# Patient Record
Sex: FEMALE | Race: WHITE | Employment: UNEMPLOYED | URBAN - METROPOLITAN AREA
[De-identification: names, ages, dates, MRNs, and addresses within clinical notes are randomized per-mention and may not be internally consistent; named-entity substitution may affect disease eponyms.]

---

## 2021-11-22 ENCOUNTER — APPOINTMENT (OUTPATIENT)
Dept: ULTRASOUND IMAGING | Age: 36
End: 2021-11-22
Attending: STUDENT IN AN ORGANIZED HEALTH CARE EDUCATION/TRAINING PROGRAM
Payer: COMMERCIAL

## 2021-11-22 ENCOUNTER — HOSPITAL ENCOUNTER (EMERGENCY)
Age: 36
Discharge: HOME | End: 2021-11-23
Attending: STUDENT IN AN ORGANIZED HEALTH CARE EDUCATION/TRAINING PROGRAM
Payer: COMMERCIAL

## 2021-11-22 DIAGNOSIS — O00.202 ECTOPIC PREGNANCY OF LEFT OVARY: Primary | ICD-10-CM

## 2021-11-22 LAB
ANION GAP SERPL CALC-SCNC: 5 MMOL/L (ref 3–18)
APPEARANCE UR: CLEAR
BACTERIA URNS QL MICRO: ABNORMAL /HPF
BASOPHILS # BLD: 0.1 K/UL (ref 0–0.1)
BASOPHILS NFR BLD: 1 % (ref 0–2)
BILIRUB UR QL: NEGATIVE
BUN SERPL-MCNC: 12 MG/DL (ref 7–18)
BUN/CREAT SERPL: 14 (ref 12–20)
CALCIUM SERPL-MCNC: 9.4 MG/DL (ref 8.5–10.1)
CHLORIDE SERPL-SCNC: 106 MMOL/L (ref 100–111)
CO2 SERPL-SCNC: 27 MMOL/L (ref 21–32)
COLOR UR: YELLOW
CREAT SERPL-MCNC: 0.88 MG/DL (ref 0.6–1.3)
DIFFERENTIAL METHOD BLD: ABNORMAL
EOSINOPHIL # BLD: 0.2 K/UL (ref 0–0.4)
EOSINOPHIL NFR BLD: 3 % (ref 0–5)
EPITH CASTS URNS QL MICRO: ABNORMAL /LPF (ref 0–5)
ERYTHROCYTE [DISTWIDTH] IN BLOOD BY AUTOMATED COUNT: 11.9 % (ref 11.6–14.5)
GLUCOSE SERPL-MCNC: 101 MG/DL (ref 74–99)
GLUCOSE UR STRIP.AUTO-MCNC: NEGATIVE MG/DL
HCG SERPL-ACNC: 681 MIU/ML (ref 0–10)
HCT VFR BLD AUTO: 36.5 % (ref 35–45)
HGB BLD-MCNC: 12.5 G/DL (ref 12–16)
HGB UR QL STRIP: ABNORMAL
IMM GRANULOCYTES # BLD AUTO: 0 K/UL (ref 0–0.04)
IMM GRANULOCYTES NFR BLD AUTO: 0 % (ref 0–0.5)
KETONES UR QL STRIP.AUTO: ABNORMAL MG/DL
LEUKOCYTE ESTERASE UR QL STRIP.AUTO: NEGATIVE
LYMPHOCYTES # BLD: 2.4 K/UL (ref 0.9–3.6)
LYMPHOCYTES NFR BLD: 29 % (ref 21–52)
MCH RBC QN AUTO: 30.7 PG (ref 24–34)
MCHC RBC AUTO-ENTMCNC: 34.2 G/DL (ref 31–37)
MCV RBC AUTO: 89.7 FL (ref 78–100)
MONOCYTES # BLD: 0.4 K/UL (ref 0.05–1.2)
MONOCYTES NFR BLD: 5 % (ref 3–10)
NEUTS SEG # BLD: 5.1 K/UL (ref 1.8–8)
NEUTS SEG NFR BLD: 62 % (ref 40–73)
NITRITE UR QL STRIP.AUTO: NEGATIVE
NRBC # BLD: 0 K/UL (ref 0–0.01)
NRBC BLD-RTO: 0 PER 100 WBC
PH UR STRIP: 6.5 [PH] (ref 5–8)
PLATELET # BLD AUTO: 260 K/UL (ref 135–420)
PMV BLD AUTO: 9.9 FL (ref 9.2–11.8)
POTASSIUM SERPL-SCNC: 3.5 MMOL/L (ref 3.5–5.5)
PROT UR STRIP-MCNC: NEGATIVE MG/DL
RBC # BLD AUTO: 4.07 M/UL (ref 4.2–5.3)
RBC #/AREA URNS HPF: ABNORMAL /HPF (ref 0–5)
SODIUM SERPL-SCNC: 138 MMOL/L (ref 136–145)
SP GR UR REFRACTOMETRY: 1 (ref 1–1.03)
UROBILINOGEN UR QL STRIP.AUTO: 0.2 EU/DL (ref 0.2–1)
WBC # BLD AUTO: 8.2 K/UL (ref 4.6–13.2)
WBC URNS QL MICRO: ABNORMAL /HPF (ref 0–4)

## 2021-11-22 PROCEDURE — 80048 BASIC METABOLIC PNL TOTAL CA: CPT

## 2021-11-22 PROCEDURE — 85025 COMPLETE CBC W/AUTO DIFF WBC: CPT

## 2021-11-22 PROCEDURE — 99284 EMERGENCY DEPT VISIT MOD MDM: CPT

## 2021-11-22 PROCEDURE — 81001 URINALYSIS AUTO W/SCOPE: CPT

## 2021-11-22 PROCEDURE — 86901 BLOOD TYPING SEROLOGIC RH(D): CPT

## 2021-11-22 PROCEDURE — 84702 CHORIONIC GONADOTROPIN TEST: CPT

## 2021-11-22 PROCEDURE — 76817 TRANSVAGINAL US OBSTETRIC: CPT

## 2021-11-23 ENCOUNTER — ANESTHESIA (OUTPATIENT)
Dept: SURGERY | Age: 36
End: 2021-11-23
Payer: COMMERCIAL

## 2021-11-23 ENCOUNTER — HOSPITAL ENCOUNTER (OUTPATIENT)
Age: 36
Setting detail: OUTPATIENT SURGERY
Discharge: HOME | End: 2021-11-23
Attending: STUDENT IN AN ORGANIZED HEALTH CARE EDUCATION/TRAINING PROGRAM | Admitting: STUDENT IN AN ORGANIZED HEALTH CARE EDUCATION/TRAINING PROGRAM
Payer: COMMERCIAL

## 2021-11-23 ENCOUNTER — ANESTHESIA EVENT (OUTPATIENT)
Dept: SURGERY | Age: 36
End: 2021-11-23
Payer: COMMERCIAL

## 2021-11-23 ENCOUNTER — APPOINTMENT (OUTPATIENT)
Dept: ULTRASOUND IMAGING | Age: 36
End: 2021-11-23
Attending: STUDENT IN AN ORGANIZED HEALTH CARE EDUCATION/TRAINING PROGRAM
Payer: COMMERCIAL

## 2021-11-23 VITALS
SYSTOLIC BLOOD PRESSURE: 148 MMHG | WEIGHT: 170.4 LBS | DIASTOLIC BLOOD PRESSURE: 93 MMHG | HEART RATE: 102 BPM | BODY MASS INDEX: 26.74 KG/M2 | HEIGHT: 67 IN | RESPIRATION RATE: 20 BRPM | TEMPERATURE: 99.5 F | OXYGEN SATURATION: 100 %

## 2021-11-23 VITALS
RESPIRATION RATE: 20 BRPM | OXYGEN SATURATION: 100 % | DIASTOLIC BLOOD PRESSURE: 55 MMHG | HEART RATE: 87 BPM | SYSTOLIC BLOOD PRESSURE: 105 MMHG | TEMPERATURE: 98.2 F

## 2021-11-23 DIAGNOSIS — Z98.890 STATUS POST LAPAROSCOPY: Primary | ICD-10-CM

## 2021-11-23 PROBLEM — O36.80X0 PREGNANCY OF UNKNOWN ANATOMIC LOCATION: Status: ACTIVE | Noted: 2021-11-23

## 2021-11-23 LAB
ABO + RH BLD: NORMAL
ABO + RH BLD: NORMAL
ALBUMIN SERPL-MCNC: 4 G/DL (ref 3.4–5)
ALBUMIN/GLOB SERPL: 1.1 {RATIO} (ref 0.8–1.7)
ALP SERPL-CCNC: 51 U/L (ref 45–117)
ALT SERPL-CCNC: 20 U/L (ref 13–56)
ANION GAP SERPL CALC-SCNC: 7 MMOL/L (ref 3–18)
AST SERPL-CCNC: 13 U/L (ref 10–38)
ATRIAL RATE: 91 BPM
BASOPHILS # BLD: 0 K/UL (ref 0–0.1)
BASOPHILS NFR BLD: 1 % (ref 0–2)
BILIRUB DIRECT SERPL-MCNC: 0.2 MG/DL (ref 0–0.2)
BILIRUB SERPL-MCNC: 0.6 MG/DL (ref 0.2–1)
BLOOD GROUP ANTIBODIES SERPL: NORMAL
BLOOD GROUP ANTIBODIES SERPL: NORMAL
BUN SERPL-MCNC: 8 MG/DL (ref 7–18)
BUN/CREAT SERPL: 11 (ref 12–20)
C TRACH RRNA SPEC QL NAA+PROBE: NEGATIVE
CALCIUM SERPL-MCNC: 8.9 MG/DL (ref 8.5–10.1)
CALCULATED P AXIS, ECG09: 63 DEGREES
CALCULATED R AXIS, ECG10: 78 DEGREES
CALCULATED T AXIS, ECG11: 75 DEGREES
CHLORIDE SERPL-SCNC: 109 MMOL/L (ref 100–111)
CO2 SERPL-SCNC: 24 MMOL/L (ref 21–32)
COVID-19 RAPID TEST, COVR: NOT DETECTED
CREAT SERPL-MCNC: 0.71 MG/DL (ref 0.6–1.3)
DIAGNOSIS, 93000: NORMAL
DIFFERENTIAL METHOD BLD: ABNORMAL
EOSINOPHIL # BLD: 0.1 K/UL (ref 0–0.4)
EOSINOPHIL NFR BLD: 1 % (ref 0–5)
ERYTHROCYTE [DISTWIDTH] IN BLOOD BY AUTOMATED COUNT: 11.9 % (ref 11.6–14.5)
ERYTHROCYTE [DISTWIDTH] IN BLOOD BY AUTOMATED COUNT: 11.9 % (ref 11.6–14.5)
GLOBULIN SER CALC-MCNC: 3.5 G/DL (ref 2–4)
GLUCOSE SERPL-MCNC: 90 MG/DL (ref 74–99)
HCG SERPL-ACNC: 626 MIU/ML (ref 0–10)
HCG SERPL-ACNC: 879 MIU/ML (ref 0–10)
HCT VFR BLD AUTO: 33.3 % (ref 35–45)
HCT VFR BLD AUTO: 36.7 % (ref 35–45)
HGB BLD-MCNC: 11.1 G/DL (ref 12–16)
HGB BLD-MCNC: 12.2 G/DL (ref 12–16)
IMM GRANULOCYTES # BLD AUTO: 0 K/UL (ref 0–0.04)
IMM GRANULOCYTES NFR BLD AUTO: 0 % (ref 0–0.5)
LIPASE SERPL-CCNC: 65 U/L (ref 73–393)
LYMPHOCYTES # BLD: 1.7 K/UL (ref 0.9–3.6)
LYMPHOCYTES NFR BLD: 23 % (ref 21–52)
MCH RBC QN AUTO: 30 PG (ref 24–34)
MCH RBC QN AUTO: 30.7 PG (ref 24–34)
MCHC RBC AUTO-ENTMCNC: 33.2 G/DL (ref 31–37)
MCHC RBC AUTO-ENTMCNC: 33.3 G/DL (ref 31–37)
MCV RBC AUTO: 90.4 FL (ref 78–100)
MCV RBC AUTO: 92 FL (ref 78–100)
MONOCYTES # BLD: 0.4 K/UL (ref 0.05–1.2)
MONOCYTES NFR BLD: 5 % (ref 3–10)
N GONORRHOEA RRNA SPEC QL NAA+PROBE: NEGATIVE
NEUTS SEG # BLD: 5.5 K/UL (ref 1.8–8)
NEUTS SEG NFR BLD: 71 % (ref 40–73)
NRBC # BLD: 0 K/UL (ref 0–0.01)
NRBC # BLD: 0 K/UL (ref 0–0.01)
NRBC BLD-RTO: 0 PER 100 WBC
NRBC BLD-RTO: 0 PER 100 WBC
P-R INTERVAL, ECG05: 156 MS
PLATELET # BLD AUTO: 209 K/UL (ref 135–420)
PLATELET # BLD AUTO: 248 K/UL (ref 135–420)
PMV BLD AUTO: 10.4 FL (ref 9.2–11.8)
PMV BLD AUTO: 9.8 FL (ref 9.2–11.8)
POTASSIUM SERPL-SCNC: 3.6 MMOL/L (ref 3.5–5.5)
PROT SERPL-MCNC: 7.5 G/DL (ref 6.4–8.2)
Q-T INTERVAL, ECG07: 366 MS
QRS DURATION, ECG06: 74 MS
QTC CALCULATION (BEZET), ECG08: 450 MS
RBC # BLD AUTO: 3.62 M/UL (ref 4.2–5.3)
RBC # BLD AUTO: 4.06 M/UL (ref 4.2–5.3)
SODIUM SERPL-SCNC: 140 MMOL/L (ref 136–145)
SOURCE, COVRS: NORMAL
SPECIMEN EXP DATE BLD: NORMAL
SPECIMEN EXP DATE BLD: NORMAL
SPECIMEN SOURCE: NORMAL
VENTRICULAR RATE, ECG03: 91 BPM
WBC # BLD AUTO: 6.2 K/UL (ref 4.6–13.2)
WBC # BLD AUTO: 7.7 K/UL (ref 4.6–13.2)

## 2021-11-23 PROCEDURE — 77030002933 HC SUT MCRYL J&J -A: Performed by: STUDENT IN AN ORGANIZED HEALTH CARE EDUCATION/TRAINING PROGRAM

## 2021-11-23 PROCEDURE — G0378 HOSPITAL OBSERVATION PER HR: HCPCS

## 2021-11-23 PROCEDURE — 77030016151 HC PROTCTR LNS DFOG COVD -B: Performed by: STUDENT IN AN ORGANIZED HEALTH CARE EDUCATION/TRAINING PROGRAM

## 2021-11-23 PROCEDURE — 74011250636 HC RX REV CODE- 250/636: Performed by: STUDENT IN AN ORGANIZED HEALTH CARE EDUCATION/TRAINING PROGRAM

## 2021-11-23 PROCEDURE — 99285 EMERGENCY DEPT VISIT HI MDM: CPT

## 2021-11-23 PROCEDURE — 77030020782 HC GWN BAIR PAWS FLX 3M -B: Performed by: STUDENT IN AN ORGANIZED HEALTH CARE EDUCATION/TRAINING PROGRAM

## 2021-11-23 PROCEDURE — 87086 URINE CULTURE/COLONY COUNT: CPT

## 2021-11-23 PROCEDURE — 77030022703 HC LIGASURE  BLNT LAPSCP SEAL COVD -E: Performed by: STUDENT IN AN ORGANIZED HEALTH CARE EDUCATION/TRAINING PROGRAM

## 2021-11-23 PROCEDURE — 76817 TRANSVAGINAL US OBSTETRIC: CPT

## 2021-11-23 PROCEDURE — 77030040830 HC CATH URETH FOL MDII -A: Performed by: STUDENT IN AN ORGANIZED HEALTH CARE EDUCATION/TRAINING PROGRAM

## 2021-11-23 PROCEDURE — 93005 ELECTROCARDIOGRAM TRACING: CPT

## 2021-11-23 PROCEDURE — 77030013079 HC BLNKT BAIR HGGR 3M -A: Performed by: ANESTHESIOLOGY

## 2021-11-23 PROCEDURE — 74011250637 HC RX REV CODE- 250/637: Performed by: STUDENT IN AN ORGANIZED HEALTH CARE EDUCATION/TRAINING PROGRAM

## 2021-11-23 PROCEDURE — 76210000026 HC REC RM PH II 1 TO 1.5 HR: Performed by: STUDENT IN AN ORGANIZED HEALTH CARE EDUCATION/TRAINING PROGRAM

## 2021-11-23 PROCEDURE — 77030020407 HC IV BLD WRMR ST 3M -A: Performed by: ANESTHESIOLOGY

## 2021-11-23 PROCEDURE — 84702 CHORIONIC GONADOTROPIN TEST: CPT

## 2021-11-23 PROCEDURE — 74011000250 HC RX REV CODE- 250: Performed by: STUDENT IN AN ORGANIZED HEALTH CARE EDUCATION/TRAINING PROGRAM

## 2021-11-23 PROCEDURE — 80076 HEPATIC FUNCTION PANEL: CPT

## 2021-11-23 PROCEDURE — 77030008608 HC TRCR ENDOSC SMTH AMR -B: Performed by: STUDENT IN AN ORGANIZED HEALTH CARE EDUCATION/TRAINING PROGRAM

## 2021-11-23 PROCEDURE — 88305 TISSUE EXAM BY PATHOLOGIST: CPT

## 2021-11-23 PROCEDURE — 74011000250 HC RX REV CODE- 250: Performed by: NURSE ANESTHETIST, CERTIFIED REGISTERED

## 2021-11-23 PROCEDURE — 76010000149 HC OR TIME 1 TO 1.5 HR: Performed by: STUDENT IN AN ORGANIZED HEALTH CARE EDUCATION/TRAINING PROGRAM

## 2021-11-23 PROCEDURE — 83690 ASSAY OF LIPASE: CPT

## 2021-11-23 PROCEDURE — 77030008683 HC TU ET CUF COVD -A: Performed by: ANESTHESIOLOGY

## 2021-11-23 PROCEDURE — 77030008477 HC STYL SATN SLP COVD -A: Performed by: ANESTHESIOLOGY

## 2021-11-23 PROCEDURE — 87635 SARS-COV-2 COVID-19 AMP PRB: CPT

## 2021-11-23 PROCEDURE — 77030006643: Performed by: ANESTHESIOLOGY

## 2021-11-23 PROCEDURE — 76210000006 HC OR PH I REC 0.5 TO 1 HR: Performed by: STUDENT IN AN ORGANIZED HEALTH CARE EDUCATION/TRAINING PROGRAM

## 2021-11-23 PROCEDURE — 77030008574 HC TBNG SUC IRR STRY -B: Performed by: STUDENT IN AN ORGANIZED HEALTH CARE EDUCATION/TRAINING PROGRAM

## 2021-11-23 PROCEDURE — 85025 COMPLETE CBC W/AUTO DIFF WBC: CPT

## 2021-11-23 PROCEDURE — 86901 BLOOD TYPING SEROLOGIC RH(D): CPT

## 2021-11-23 PROCEDURE — 74011250637 HC RX REV CODE- 250/637: Performed by: ANESTHESIOLOGY

## 2021-11-23 PROCEDURE — 87491 CHLMYD TRACH DNA AMP PROBE: CPT

## 2021-11-23 PROCEDURE — 77030009851 HC PCH RTVR ENDOSC AMR -B: Performed by: STUDENT IN AN ORGANIZED HEALTH CARE EDUCATION/TRAINING PROGRAM

## 2021-11-23 PROCEDURE — 77030010507 HC ADH SKN DERMBND J&J -B: Performed by: STUDENT IN AN ORGANIZED HEALTH CARE EDUCATION/TRAINING PROGRAM

## 2021-11-23 PROCEDURE — 85027 COMPLETE CBC AUTOMATED: CPT

## 2021-11-23 PROCEDURE — 77030040361 HC SLV COMPR DVT MDII -B: Performed by: STUDENT IN AN ORGANIZED HEALTH CARE EDUCATION/TRAINING PROGRAM

## 2021-11-23 PROCEDURE — 77030014090 HC TRCR OPT AMR -B: Performed by: STUDENT IN AN ORGANIZED HEALTH CARE EDUCATION/TRAINING PROGRAM

## 2021-11-23 PROCEDURE — 77030018684: Performed by: STUDENT IN AN ORGANIZED HEALTH CARE EDUCATION/TRAINING PROGRAM

## 2021-11-23 PROCEDURE — 2709999900 HC NON-CHARGEABLE SUPPLY: Performed by: STUDENT IN AN ORGANIZED HEALTH CARE EDUCATION/TRAINING PROGRAM

## 2021-11-23 PROCEDURE — 77030031139 HC SUT VCRL2 J&J -A: Performed by: STUDENT IN AN ORGANIZED HEALTH CARE EDUCATION/TRAINING PROGRAM

## 2021-11-23 PROCEDURE — 77030012799 HC TRCR GELPRT BLN AMR -B: Performed by: STUDENT IN AN ORGANIZED HEALTH CARE EDUCATION/TRAINING PROGRAM

## 2021-11-23 PROCEDURE — 76060000033 HC ANESTHESIA 1 TO 1.5 HR: Performed by: STUDENT IN AN ORGANIZED HEALTH CARE EDUCATION/TRAINING PROGRAM

## 2021-11-23 PROCEDURE — 74011250636 HC RX REV CODE- 250/636: Performed by: NURSE ANESTHETIST, CERTIFIED REGISTERED

## 2021-11-23 PROCEDURE — 96374 THER/PROPH/DIAG INJ IV PUSH: CPT

## 2021-11-23 PROCEDURE — 77030020829: Performed by: STUDENT IN AN ORGANIZED HEALTH CARE EDUCATION/TRAINING PROGRAM

## 2021-11-23 PROCEDURE — 80048 BASIC METABOLIC PNL TOTAL CA: CPT

## 2021-11-23 RX ORDER — SODIUM CHLORIDE, SODIUM LACTATE, POTASSIUM CHLORIDE, CALCIUM CHLORIDE 600; 310; 30; 20 MG/100ML; MG/100ML; MG/100ML; MG/100ML
100 INJECTION, SOLUTION INTRAVENOUS CONTINUOUS
Status: DISCONTINUED | OUTPATIENT
Start: 2021-11-23 | End: 2021-11-24 | Stop reason: HOSPADM

## 2021-11-23 RX ORDER — PHENYLEPHRINE HCL IN 0.9% NACL 1 MG/10 ML
SYRINGE (ML) INTRAVENOUS AS NEEDED
Status: DISCONTINUED | OUTPATIENT
Start: 2021-11-23 | End: 2021-11-23 | Stop reason: HOSPADM

## 2021-11-23 RX ORDER — FENTANYL CITRATE 50 UG/ML
INJECTION, SOLUTION INTRAMUSCULAR; INTRAVENOUS AS NEEDED
Status: DISCONTINUED | OUTPATIENT
Start: 2021-11-23 | End: 2021-11-23 | Stop reason: HOSPADM

## 2021-11-23 RX ORDER — CEFAZOLIN SODIUM/WATER 2 G/20 ML
2 SYRINGE (ML) INTRAVENOUS ONCE
Status: COMPLETED | OUTPATIENT
Start: 2021-11-23 | End: 2021-11-23

## 2021-11-23 RX ORDER — SCOLOPAMINE TRANSDERMAL SYSTEM 1 MG/1
1 PATCH, EXTENDED RELEASE TRANSDERMAL
Status: DISCONTINUED | OUTPATIENT
Start: 2021-11-23 | End: 2021-11-24 | Stop reason: HOSPADM

## 2021-11-23 RX ORDER — KETOROLAC TROMETHAMINE 15 MG/ML
INJECTION, SOLUTION INTRAMUSCULAR; INTRAVENOUS AS NEEDED
Status: DISCONTINUED | OUTPATIENT
Start: 2021-11-23 | End: 2021-11-23 | Stop reason: HOSPADM

## 2021-11-23 RX ORDER — DIPHENHYDRAMINE HYDROCHLORIDE 50 MG/ML
12.5 INJECTION, SOLUTION INTRAMUSCULAR; INTRAVENOUS
Status: DISCONTINUED | OUTPATIENT
Start: 2021-11-23 | End: 2021-11-24 | Stop reason: HOSPADM

## 2021-11-23 RX ORDER — DEXAMETHASONE SODIUM PHOSPHATE 4 MG/ML
INJECTION, SOLUTION INTRA-ARTICULAR; INTRALESIONAL; INTRAMUSCULAR; INTRAVENOUS; SOFT TISSUE AS NEEDED
Status: DISCONTINUED | OUTPATIENT
Start: 2021-11-23 | End: 2021-11-23 | Stop reason: HOSPADM

## 2021-11-23 RX ORDER — ROCURONIUM BROMIDE 10 MG/ML
INJECTION, SOLUTION INTRAVENOUS AS NEEDED
Status: DISCONTINUED | OUTPATIENT
Start: 2021-11-23 | End: 2021-11-23 | Stop reason: HOSPADM

## 2021-11-23 RX ORDER — GLYCOPYRROLATE 0.2 MG/ML
INJECTION INTRAMUSCULAR; INTRAVENOUS AS NEEDED
Status: DISCONTINUED | OUTPATIENT
Start: 2021-11-23 | End: 2021-11-23 | Stop reason: HOSPADM

## 2021-11-23 RX ORDER — ONDANSETRON 4 MG/1
4 TABLET, ORALLY DISINTEGRATING ORAL
Qty: 30 TABLET | Refills: 0 | Status: SHIPPED | OUTPATIENT
Start: 2021-11-23

## 2021-11-23 RX ORDER — OXYCODONE HYDROCHLORIDE 5 MG/1
5 TABLET ORAL
Qty: 6 TABLET | Refills: 0 | Status: SHIPPED | OUTPATIENT
Start: 2021-11-23 | End: 2021-11-26

## 2021-11-23 RX ORDER — PROPOFOL 10 MG/ML
INJECTION, EMULSION INTRAVENOUS AS NEEDED
Status: DISCONTINUED | OUTPATIENT
Start: 2021-11-23 | End: 2021-11-23 | Stop reason: HOSPADM

## 2021-11-23 RX ORDER — NEOSTIGMINE METHYLSULFATE 1 MG/ML
INJECTION, SOLUTION INTRAVENOUS AS NEEDED
Status: DISCONTINUED | OUTPATIENT
Start: 2021-11-23 | End: 2021-11-23 | Stop reason: HOSPADM

## 2021-11-23 RX ORDER — MIDAZOLAM HYDROCHLORIDE 1 MG/ML
INJECTION, SOLUTION INTRAMUSCULAR; INTRAVENOUS AS NEEDED
Status: DISCONTINUED | OUTPATIENT
Start: 2021-11-23 | End: 2021-11-23 | Stop reason: HOSPADM

## 2021-11-23 RX ORDER — KETOROLAC TROMETHAMINE 30 MG/ML
30 INJECTION, SOLUTION INTRAMUSCULAR; INTRAVENOUS
Status: COMPLETED | OUTPATIENT
Start: 2021-11-23 | End: 2021-11-23

## 2021-11-23 RX ORDER — LIDOCAINE HYDROCHLORIDE 20 MG/ML
INJECTION, SOLUTION EPIDURAL; INFILTRATION; INTRACAUDAL; PERINEURAL AS NEEDED
Status: DISCONTINUED | OUTPATIENT
Start: 2021-11-23 | End: 2021-11-23 | Stop reason: HOSPADM

## 2021-11-23 RX ORDER — IBUPROFEN 400 MG/1
800 TABLET ORAL
Status: DISCONTINUED | OUTPATIENT
Start: 2021-11-23 | End: 2021-11-24 | Stop reason: HOSPADM

## 2021-11-23 RX ORDER — ALBUTEROL SULFATE 0.83 MG/ML
2.5 SOLUTION RESPIRATORY (INHALATION)
Status: DISCONTINUED | OUTPATIENT
Start: 2021-11-23 | End: 2021-11-24 | Stop reason: HOSPADM

## 2021-11-23 RX ORDER — METOCLOPRAMIDE HYDROCHLORIDE 5 MG/ML
INJECTION INTRAMUSCULAR; INTRAVENOUS AS NEEDED
Status: DISCONTINUED | OUTPATIENT
Start: 2021-11-23 | End: 2021-11-23 | Stop reason: HOSPADM

## 2021-11-23 RX ORDER — ONDANSETRON 2 MG/ML
INJECTION INTRAMUSCULAR; INTRAVENOUS AS NEEDED
Status: DISCONTINUED | OUTPATIENT
Start: 2021-11-23 | End: 2021-11-23 | Stop reason: HOSPADM

## 2021-11-23 RX ORDER — NALOXONE HYDROCHLORIDE 0.4 MG/ML
0.1 INJECTION, SOLUTION INTRAMUSCULAR; INTRAVENOUS; SUBCUTANEOUS AS NEEDED
Status: DISCONTINUED | OUTPATIENT
Start: 2021-11-23 | End: 2021-11-24 | Stop reason: HOSPADM

## 2021-11-23 RX ORDER — KETAMINE HYDROCHLORIDE 10 MG/ML
INJECTION, SOLUTION INTRAMUSCULAR; INTRAVENOUS AS NEEDED
Status: DISCONTINUED | OUTPATIENT
Start: 2021-11-23 | End: 2021-11-23 | Stop reason: HOSPADM

## 2021-11-23 RX ORDER — HYDROMORPHONE HYDROCHLORIDE 2 MG/ML
0.5 INJECTION, SOLUTION INTRAMUSCULAR; INTRAVENOUS; SUBCUTANEOUS
Status: DISCONTINUED | OUTPATIENT
Start: 2021-11-23 | End: 2021-11-24 | Stop reason: HOSPADM

## 2021-11-23 RX ORDER — FENTANYL CITRATE 50 UG/ML
25 INJECTION, SOLUTION INTRAMUSCULAR; INTRAVENOUS AS NEEDED
Status: DISCONTINUED | OUTPATIENT
Start: 2021-11-23 | End: 2021-11-24 | Stop reason: HOSPADM

## 2021-11-23 RX ORDER — SODIUM CHLORIDE, SODIUM LACTATE, POTASSIUM CHLORIDE, CALCIUM CHLORIDE 600; 310; 30; 20 MG/100ML; MG/100ML; MG/100ML; MG/100ML
150 INJECTION, SOLUTION INTRAVENOUS CONTINUOUS
Status: DISCONTINUED | OUTPATIENT
Start: 2021-11-23 | End: 2021-11-24 | Stop reason: HOSPADM

## 2021-11-23 RX ORDER — SODIUM CHLORIDE 0.9 % (FLUSH) 0.9 %
5-40 SYRINGE (ML) INJECTION EVERY 8 HOURS
Status: DISCONTINUED | OUTPATIENT
Start: 2021-11-23 | End: 2021-11-24 | Stop reason: HOSPADM

## 2021-11-23 RX ORDER — SODIUM CHLORIDE 0.9 % (FLUSH) 0.9 %
5-40 SYRINGE (ML) INJECTION AS NEEDED
Status: DISCONTINUED | OUTPATIENT
Start: 2021-11-23 | End: 2021-11-24 | Stop reason: HOSPADM

## 2021-11-23 RX ADMIN — ONDANSETRON HYDROCHLORIDE 4 MG: 2 INJECTION INTRAMUSCULAR; INTRAVENOUS at 17:50

## 2021-11-23 RX ADMIN — LIDOCAINE HYDROCHLORIDE 50 MG: 20 INJECTION, SOLUTION INTRAVENOUS at 17:05

## 2021-11-23 RX ADMIN — Medication 2.5 MG: at 17:52

## 2021-11-23 RX ADMIN — FENTANYL CITRATE 50 MCG: 50 INJECTION, SOLUTION INTRAMUSCULAR; INTRAVENOUS at 17:04

## 2021-11-23 RX ADMIN — GLYCOPYRROLATE 0.4 MG: 0.2 INJECTION INTRAMUSCULAR; INTRAVENOUS at 17:52

## 2021-11-23 RX ADMIN — CEFAZOLIN 2 G: 1 INJECTION, POWDER, FOR SOLUTION INTRAVENOUS at 17:11

## 2021-11-23 RX ADMIN — KETAMINE HYDROCHLORIDE 20 MG: 10 INJECTION, SOLUTION INTRAMUSCULAR; INTRAVENOUS at 17:29

## 2021-11-23 RX ADMIN — METOCLOPRAMIDE 10 MG: 5 INJECTION, SOLUTION INTRAMUSCULAR; INTRAVENOUS at 17:58

## 2021-11-23 RX ADMIN — ROCURONIUM BROMIDE 40 MG: 10 INJECTION, SOLUTION INTRAVENOUS at 17:05

## 2021-11-23 RX ADMIN — Medication 100 MCG: at 17:19

## 2021-11-23 RX ADMIN — MIDAZOLAM 2 MG: 1 INJECTION INTRAMUSCULAR; INTRAVENOUS at 16:58

## 2021-11-23 RX ADMIN — KETOROLAC TROMETHAMINE 30 MG: 30 INJECTION, SOLUTION INTRAMUSCULAR at 04:49

## 2021-11-23 RX ADMIN — PROPOFOL 120 MG: 10 INJECTION, EMULSION INTRAVENOUS at 17:05

## 2021-11-23 RX ADMIN — KETOROLAC TROMETHAMINE 30 MG: 15 INJECTION, SOLUTION INTRAMUSCULAR; INTRAVENOUS at 17:50

## 2021-11-23 RX ADMIN — KETAMINE HYDROCHLORIDE 10 MG: 10 INJECTION, SOLUTION INTRAMUSCULAR; INTRAVENOUS at 17:36

## 2021-11-23 RX ADMIN — IBUPROFEN 800 MG: 400 TABLET, FILM COATED ORAL at 12:19

## 2021-11-23 RX ADMIN — FENTANYL CITRATE 50 MCG: 50 INJECTION, SOLUTION INTRAMUSCULAR; INTRAVENOUS at 17:00

## 2021-11-23 RX ADMIN — DEXAMETHASONE SODIUM PHOSPHATE 8 MG: 4 INJECTION, SOLUTION INTRAMUSCULAR; INTRAVENOUS at 17:20

## 2021-11-23 RX ADMIN — SODIUM CHLORIDE, SODIUM LACTATE, POTASSIUM CHLORIDE, AND CALCIUM CHLORIDE 100 ML/HR: 600; 310; 30; 20 INJECTION, SOLUTION INTRAVENOUS at 04:30

## 2021-11-23 RX ADMIN — SODIUM CHLORIDE, SODIUM LACTATE, POTASSIUM CHLORIDE, AND CALCIUM CHLORIDE 150 ML/HR: 600; 310; 30; 20 INJECTION, SOLUTION INTRAVENOUS at 14:02

## 2021-11-23 RX ADMIN — SODIUM CHLORIDE, SODIUM LACTATE, POTASSIUM CHLORIDE, AND CALCIUM CHLORIDE: 600; 310; 30; 20 INJECTION, SOLUTION INTRAVENOUS at 17:24

## 2021-11-23 NOTE — DISCHARGE INSTRUCTIONS
Two Weeks Follow up with your primary OBGYN in 2 weeks. If you have any questions, please call Dr. Cyril Regan at [facility], [address] [phone]    Patient armband removed and shredded    DISCHARGE SUMMARY from Nurse    PATIENT INSTRUCTIONS:    After general anesthesia or intravenous sedation, for 24 hours or while taking prescription Narcotics:  · Limit your activities  · Do not drive and operate hazardous machinery  · Do not make important personal or business decisions  · Do  not drink alcoholic beverages  · If you have not urinated within 8 hours after discharge, please contact your surgeon on call. Report the following to your surgeon:  · Excessive pain, swelling, redness or odor of or around the surgical area  · Temperature over 100.5  · Nausea and vomiting lasting longer than 4 hours or if unable to take medications  · Any signs of decreased circulation or nerve impairment to extremity: change in color, persistent  numbness, tingling, coldness or increase pain  · Any questions    What to do at Home:  Recommended activity: Activity as tolerated and no driving for today, No driving while on analgesics and No heavy lifting   If you experience any of the following symptoms Fever, chills, pain uncontrolled by prescribed pain meds, odorous drainage, redness and or swelling at incision site  , please follow up with Dr. Kavya Mike office. *  Please give a list of your current medications to your Primary Care Provider. *  Please update this list whenever your medications are discontinued, doses are      changed, or new medications (including over-the-counter products) are added. *  Please carry medication information at all times in case of emergency situations.     These are general instructions for a healthy lifestyle:    No smoking/ No tobacco products/ Avoid exposure to second hand smoke  Surgeon General's Warning:  Quitting smoking now greatly reduces serious risk to your health. Obesity, smoking, and sedentary lifestyle greatly increases your risk for illness    A healthy diet, regular physical exercise & weight monitoring are important for maintaining a healthy lifestyle    You may be retaining fluid if you have a history of heart failure or if you experience any of the following symptoms:  Weight gain of 3 pounds or more overnight or 5 pounds in a week, increased swelling in our hands or feet or shortness of breath while lying flat in bed. Please call your doctor as soon as you notice any of these symptoms; do not wait until your next office visit. The discharge information has been reviewed with the patient and spouse. The patient and spouse verbalized understanding. Discharge medications reviewed with the patient and spouse and appropriate educational materials and side effects teaching were provided. ___________________________________________________________________________________________________________________________________  Learning About Coronavirus (IODEV-35)  Coronavirus [phone]): Overview  What is coronavirus (IHWWU-96)? The coronavirus disease (COVID-19) is caused by a virus. It is an illness that was first found in [place], [place], in December 2019. It has since spread worldwide. The virus can cause fever, cough, and trouble breathing. In severe cases, it can cause pneumonia and make it hard to breathe without help. It can cause death. Coronaviruses are a large group of viruses. They cause the common cold. They also cause more serious illnesses like Middle East respiratory syndrome (MERS) and severe acute respiratory syndrome (SARS). COVID-19 is caused by a novel coronavirus. That means it's a new type that has not been seen in people before. This virus spreads person-to-person through droplets from coughing and sneezing. It can also spread when you are close to someone who is infected.  And it can spread when you touch something that has the virus on it, such as a doorknob or a tabletop. What can you do to protect yourself from coronavirus (COVID-19)? The best way to protect yourself from getting sick is to:  · Avoid areas where there is an outbreak. · Avoid contact with people who may be infected. · Wash your hands often with soap or alcohol-based hand sanitizers. · Avoid crowds and try to stay at least 6 feet away from other people. · Wash your hands often, especially after you cough or sneeze. Use soap and water, and scrub for at least 20 seconds. If soap and water aren't available, use an alcohol-based hand . · Avoid touching your mouth, nose, and eyes. What can you do to avoid spreading the virus to others? To help avoid spreading the virus to others:  · Cover your mouth with a tissue when you cough or sneeze. Then throw the tissue in the trash. · Use a disinfectant to clean things that you touch often. · Stay home if you are sick or have been exposed to the virus. Don't go to school, work, or public areas. And don't use public transportation. · If you are sick:  ? Leave your home only if you need to get medical care. But call the doctor's office first so they know you're coming. And wear a face mask, if you have one.  ? If you have a face mask, wear it whenever you're around other people. It can help stop the spread of the virus when you cough or sneeze. ? Clean and disinfect your home every day. Use household  and disinfectant wipes or sprays. Take special care to clean things that you grab with your hands. These include doorknobs, remote controls, phones, and handles on your refrigerator and microwave. And don't forget countertops, tabletops, bathrooms, and computer keyboards. When to call for help  Call 911 anytime you think you may need emergency care. For example, call if:  · You have severe trouble breathing. (You can't talk at all.)  · You have constant chest pain or pressure.   · You are severely dizzy or lightheaded. · You are confused or can't think clearly. · Your face and lips have a blue color. · You pass out (lose consciousness) or are very hard to wake up. Call your doctor now if you develop symptoms such as:  · Shortness of breath. · Fever. · Cough. If you need to get care, call ahead to the doctor's office for instructions before you go. Make sure you wear a face mask, if you have one, to prevent exposing other people to the virus. Where can you get the latest information? The following health organizations are tracking and studying this virus. Their websites contain the most up-to-date information. Monica Estrada also learn what to do if you think you may have been exposed to the virus. · U.S. Centers for Disease Control and Prevention (CDC): The CDC provides updated news about the disease and travel advice. The website also tells you how to prevent the spread of infection. www.cdc.gov  · World Health Organization [facility]): WHO offers information about the virus outbreaks. WHO also has travel advice. www.who.int  Current as of: April 1, 2020               Content Version: 12.4  © 7262-1196 [employer]. Care instructions adapted under license by your healthcare professional. If you have questions about a medical condition or this instruction, always ask your healthcare professional. [address] any warranty or liability for your use of this information.

## 2021-11-23 NOTE — ED NOTES
Pt left ED in stable condition, transferred to [facility]. All belongings sent with pt.  IV intact

## 2021-11-23 NOTE — PERIOP NOTES
TRANSFER - IN REPORT:    Verbal report received from Regine Gar. CRNA(name) on [employer]  being received from OR(unit) for routine progression of care      Report consisted of patients Situation, Background, Assessment and   Recommendations(SBAR). Information from the following report(s) SBAR, OR Summary, Procedure Summary, Intake/Output, MAR and Recent Results was reviewed with the receiving nurse. Opportunity for questions and clarification was provided. Assessment completed upon patients arrival to unit and care assumed.

## 2021-11-23 NOTE — ED NOTES
TRANSFER - OUT REPORT:    Verbal report given to [employer] (name) on Veda Singer  being transferred to OR(unit) for ordered procedure       Report consisted of patients Situation, Background, Assessment and   Recommendations(SBAR). Information from the following report(s) SBAR, ED Summary, MAR, Recent Results and Cardiac Rhythm NSR was reviewed with the receiving nurse. Lines:   Peripheral IV 11/23/21 Right Antecubital (Active)   Site Assessment Clean, dry, & intact 11/23/21 0418   Phlebitis Assessment 0 11/23/21 0418   Infiltration Assessment 0 11/23/21 0418   Dressing Status Clean, dry, & intact 11/23/21 0418        Opportunity for questions and clarification was provided.       Patient transported with:   Registered Nurse

## 2021-11-23 NOTE — OP NOTES
Gynecologic Laparoscopy Procedure Note    Patient ID:   Name: Ramonita Carroll    Medical Record Number: [id number]    YOB: 1985 November 23, 2021      Preoperative Diagnosis:   ETOPIC PREGNANCY    Postoperative Diagnosis: LEFT ETOPIC PREGNANCY, hemoperitoneum    Surgeon: Wilfrido Fernández MD    Assistant: Surg Asst-1: Merline Bar    Anesthesia: General  Antibiotic: Ancef 2g    Procedure:   Dilation and curettage  Diagnostic laparoscopy  Evacuation of hemoperitoneum  Left salpingectomy, removal of ectopic pregnancy    Estimated Blood Loss: <20cc from surgical portion of case; 50cc hemoperitoneum in pelvis    Pathology /Specimens:  Endometrial Curettings  Left fallopian tube/ectopic pregnancy    Complications: None    Findings:   D&C:   Normal vaginal tissue, blood in vaginal vault, normal appearing cervix and vaginal tissue, normal external genitalia. Laparoscopic:  No evidence of sharp or blunt injury from laparoscopic entry  Hemoperitoneum with blood in pelvis  Normal uterus, anterior, and posterior cul de sac  Normal L ovary, normal R ovary  L tube with ectopic pregnancy, normal R tube    Procedure in Detail:  The patient was taken to the operating room where she was placed in the supine position. After undergoing adequate general endotracheal anesthesia, she was placed up in the [facility] in the dorsal lithotomy position. The patient was then prepped and draped in the usual fashion and lopes catheter was placed into the bladder. Attention was first turned to the vagina where the speculum was placed in the vagina. The anterior lip of the cervix was grasped with a single-toothed tenaculum. The cervix was sequentially dilated to allow a small curette to pass. Gentle curettage was performed and endometrial curettings obtained for pathology. A Hulka tenaculum was placed without difficulty. All other instruments were removed from the vagina and 's gloves were changed. Attention was then turned to the abdomen. A vertical incision was made in the umbilicus and a 5mm optiview trocar was placed under direct vision. The abdomen was insufflated without difficulty. The above findings were noted. A 5mm trocar was placed in the LLQ and a 12mm open trocar was placed at the RLQ, both under direct visualization. There was no evidence of bowel injury nor bleeding. Hemoperitoneum was evacuated with the suction . The left fallopian tube was excised with a Ligasure and removed from the abdomen in an endocatch bag through the RLQ port. The excision site was noted to be hemostatic when intraabdominal pressure decreased. The abdomen was irrigated. All instruments were removed and CO2 gas was diffused. There was good hemostasis 12nn port site fascia was closed with 0 vicryl. Skin incisions were closed with 4.0 Vicryl in a subcuticular stitch on the skin. Dermabond placed. All instruments were removed from the vagina. Counts correct x2. The patient was awakened, extubated and taken to the recovery room in good condition.       Steven Ayala MD  [facility]  (z) [phone]  Page through [address] Time In   Patient In - Facility (Arrived) [phone]   Patient In - Pre-op    Patient Ready - For Holding    Patient Out - Pre-op    Patient In - Holding Area 9087   Anesthesia Start [phone]   Patient Ready for OR    Surgeon Available    Patient Out - Holding Area 1700   Patient In - OR 1700   Surgeon In OR 1700   Cecum Time    Incision Start 1720   Incision Close 1756   Surgeon out of OR 1800   Patient Out - OR 1807   PACU Hold in OR     Anesthesia Stop [phone]   Patient In - Phase I 1809   Notice to Anesthesia    Care Complete - Phase I    Patient Out - Phase I    Patient In - Phase II    Care Complete - Phase II    Patient Tolerates Liquids    Patient Ready for Visitors    Patient Education Complete    Patient Voided    Patient Out - Phase II    End of [address] Patient In - Overflow    Patient Out - Overflow

## 2021-11-23 NOTE — PROGRESS NOTES
bHCG resulted and increased to 879. It is not trending normally. More suspicious for ectopic pregnancy given this finding. She continues to feel unwell, and has decreased appetite. CBC not performed by lab (result states did not receive tube), so will have RN draw another CBC. Abdominal exam showed continued tenderness midline epigastric and at uterine fundus. Also tenderness in LLQ, and no tenderness in RLQ. Discussed with patient. Due to diagnostic uncertainty, presenting symptoms, and increased suspicion for ectopic at this time given further data, will proceed with surgical management. Patient counseled for [facility] , diagnostic laparoscopy, possible removal of ectopic pregnancy, possible left salpingectomy. Counseled on r/b/a of procedure, including risk of injury to surrounding organs, bleeding, infection, inadvertent injury requiring laparotomy to repair, transfusion. Patient in agreement with plan and would like to proceed with proposed surgery. Also ok with transfusion if needed. Consents signed.      Main OR called and case posted  Rapid COVID screen ordered

## 2021-11-23 NOTE — ED NOTES
Pt c/o headache, pian 5/10. Víctor Almendarez MD was contacted and made aware. Expecting orders placed by provider.

## 2021-11-23 NOTE — PROGRESS NOTES
Notified of ultrasound read. Cannot rule out ectopic pregnancy in L adnexa. Went to radiology office at [facility] to review images with radiologist Dr. Christian Quintero. Agreed no convincing evidence of ectopic but cannot completely rule out. More likely corpus luteal cyst. Discussed that if patient has stable exam and labs, it is reasonable to follow her conservatively in house. Plan also discussed with Dr. Camilla Quintero. Went to patient to discuss ultrasound findings and review plan. She clarified that her original reason for presenting to the ED was acute lower pelvic pain, which is now largely resolved. She is sitting more gingerly due to tenderness higher in her abdomen. It is possible that she had a ovarian cyst rupture and coincident early pregnancy loss. Abdominal exam: soft, patient reports slightly more tenderness than prior. No rebound or guarding. Also discussed option to perform D&C and send for frozen path to assess for presence of chorionic villi. If no chorionic villi then would do dx lsc. Given current information, do not have to go down this course at the moment. At this time plan to repeat labs at 1500 (CBC, bHCG). If stable but pain much worsened, can keep in house and repeat labs in the morning.

## 2021-11-23 NOTE — ED NOTES
Assumed pt care, pt stable resting on stretcher with visitor at bedside. No distress noted and no complaints voiced.  Pt has no needs expressed at this time

## 2021-11-23 NOTE — ED PROVIDER NOTES
EMERGENCY DEPARTMENT HISTORY AND PHYSICAL EXAM    I have evaluated the patient at 8:17 PM      Date: 2021  Patient Name: Christen Gupta    History of Presenting Illness     Chief Complaint   Patient presents with    Miscarriage     cramping for the past several days, bleeding red blood         History Provided By: Patient  Location/Duration/Severity/Modifying factors   42-year-old  approximately 5 weeks gestation presenting to the emergency department for evaluation of pelvic pain and vaginal bleeding. Believes that she is having a miscarriage. She has not experienced these symptoms in the past. Started earlier this evening. Patient is seen by OB, however, patient is from [place] and is only visiting here for the week. Fever chills, chest pain, shortness of breath, abdominal pain, NVD. PCP: Mckenzie, MD Sadiq        Past History     Past Medical History:  No past medical history on file. Past Surgical History:  No past surgical history on file. Family History:  No family history on file. Social History:  Social History     Tobacco Use    Smoking status: Never Smoker    Smokeless tobacco: Never Used   Vaping Use    Vaping Use: Never used   Substance Use Topics    Alcohol use: Never    Drug use: Never       Allergies:  Not on File      Review of Systems       Review of Systems   Constitutional: Negative for activity change, chills, diaphoresis, fatigue and fever. Respiratory: Negative for cough, chest tightness, shortness of breath, wheezing and stridor. Cardiovascular: Negative for chest pain and palpitations. Gastrointestinal: Negative for abdominal distention, abdominal pain, constipation, diarrhea, nausea and vomiting. Genitourinary: Positive for pelvic pain and vaginal bleeding. Negative for difficulty urinating, dysuria, hematuria, vaginal discharge and vaginal pain. Musculoskeletal: Negative for back pain, joint swelling and myalgias.    Skin: Negative for rash and wound. Neurological: Negative for dizziness, weakness and headaches. Psychiatric/Behavioral: Negative for agitation. The patient is not nervous/anxious. Physical Exam     Visit Vitals  /65   Pulse (!) 102   Temp 98.5 °F (36.9 °C)   Resp 18   Ht 5' 7\" (1.702 m)   Wt 77.3 kg (170 lb 6.4 oz)   LMP 10/18/2021 (Exact Date)   SpO2 100%   BMI 26.69 kg/m²         Physical Exam  Constitutional:       General: She is not in acute distress. Appearance: She is not toxic-appearing. HENT:      Head: Normocephalic and atraumatic. Mouth/Throat:      Mouth: Mucous membranes are moist.   Eyes:      Extraocular Movements: Extraocular movements intact. Pupils: Pupils are equal, round, and reactive to light. Cardiovascular:      Rate and Rhythm: Normal rate and regular rhythm. Heart sounds: Normal heart sounds. No murmur heard. No friction rub. No gallop. Pulmonary:      Effort: Pulmonary effort is normal.      Breath sounds: Normal breath sounds. Abdominal:      General: There is no distension. Palpations: Abdomen is soft. There is no mass. Tenderness: There is no abdominal tenderness. There is no guarding. Hernia: No hernia is present. Genitourinary:     General: Normal vulva. Comments: Blood in vaginal vault. Closed os  Musculoskeletal:         General: No swelling, tenderness or deformity. Cervical back: Normal range of motion and neck supple. Skin:     General: Skin is warm and dry. Findings: No rash. Neurological:      General: No focal deficit present. Mental Status: She is alert and oriented to person, place, and time.    Psychiatric:         Mood and Affect: Mood normal.           Diagnostic Study Results     Labs -  Recent Results (from the past 12 hour(s))   CBC WITH AUTOMATED DIFF    Collection Time: 11/22/21  8:22 PM   Result Value Ref Range    WBC 8.2 4.6 - 13.2 K/uL    RBC 4.07 (L) 4.20 - 5.30 M/uL    HGB 12.5 12.0 - 16.0 g/dL HCT 36.5 35.0 - 45.0 %    MCV 89.7 78.0 - 100.0 FL    MCH 30.7 24.0 - 34.0 PG    MCHC 34.2 31.0 - 37.0 g/dL    RDW 11.9 11.6 - 14.5 %    PLATELET 715 860 - 538 K/uL    MPV 9.9 9.2 - 11.8 FL    NRBC 0.0 0  WBC    ABSOLUTE NRBC 0.00 0.00 - 0.01 K/uL    NEUTROPHILS 62 40 - 73 %    LYMPHOCYTES 29 21 - 52 %    MONOCYTES 5 3 - 10 %    EOSINOPHILS 3 0 - 5 %    BASOPHILS 1 0 - 2 %    IMMATURE GRANULOCYTES 0 0.0 - 0.5 %    ABS. NEUTROPHILS 5.1 1.8 - 8.0 K/UL    ABS. LYMPHOCYTES 2.4 0.9 - 3.6 K/UL    ABS. MONOCYTES 0.4 0.05 - 1.2 K/UL    ABS. EOSINOPHILS 0.2 0.0 - 0.4 K/UL    ABS. BASOPHILS 0.1 0.0 - 0.1 K/UL    ABS. IMM.  GRANS. 0.0 0.00 - 0.04 K/UL    DF AUTOMATED     METABOLIC PANEL, BASIC    Collection Time: 11/22/21  8:22 PM   Result Value Ref Range    Sodium 138 136 - 145 mmol/L    Potassium 3.5 3.5 - 5.5 mmol/L    Chloride 106 100 - 111 mmol/L    CO2 27 21 - 32 mmol/L    Anion gap 5 3.0 - 18 mmol/L    Glucose 101 (H) 74 - 99 mg/dL    BUN 12 7.0 - 18 MG/DL    Creatinine 0.88 0.6 - 1.3 MG/DL    BUN/Creatinine ratio 14 12 - 20      GFR est AA >60 >60 ml/min/1.73m2    GFR est non-AA >60 >60 ml/min/1.73m2    Calcium 9.4 8.5 - 10.1 MG/DL   URINALYSIS W/ RFLX MICROSCOPIC    Collection Time: 11/22/21  8:22 PM   Result Value Ref Range    Color YELLOW      Appearance CLEAR      Specific gravity 1.005 1.005 - 1.030      pH (UA) 6.5 5.0 - 8.0      Protein Negative NEG mg/dL    Glucose Negative NEG mg/dL    Ketone TRACE (A) NEG mg/dL    Bilirubin Negative NEG      Blood MODERATE (A) NEG      Urobilinogen 0.2 0.2 - 1.0 EU/dL    Nitrites Negative NEG      Leukocyte Esterase Negative NEG     BETA HCG, QT    Collection Time: 11/22/21  8:22 PM   Result Value Ref Range    Beta HCG,  (H) 0 - 10 MIU/ML   TYPE & SCREEN    Collection Time: 11/22/21  8:22 PM   Result Value Ref Range    Crossmatch Expiration 11/25/2021,2359     ABO/Rh(D) A POSITIVE     Antibody screen NEG    URINE MICROSCOPIC ONLY    Collection Time: 11/22/21  8:22 PM   Result Value Ref Range    WBC 0 to 3 0 - 4 /hpf    RBC 0 to 3 0 - 5 /hpf    Epithelial cells FEW 0 - 5 /lpf    Bacteria 1+ (A) NEG /hpf       Radiologic Studies -   US OB TV W DOPPLER   Final Result      No definite intrauterine gestational sac identified. Indeterminant left adnexal   structure that appears extraovarian as described, for which ectopic gestation   should be considered in the appropriate clinical setting. From an imaging   standpoint, recommend at least serial beta-hCG measurements and follow-up   sonogram as warranted. This was called to Dr. Basilio Pemberton head 0035 hours,   2021. Small amount of free fluid, nonspecific. Possible left ovary corpus luteum. Medical Decision Making   I am the first provider for this patient. I reviewed the vital signs, available nursing notes, past medical history, past surgical history, family history and social history. Vital Signs-Reviewed the patient's vital signs. Records Reviewed: Nursing Notes (Time of Review: 8:17 PM)    ED Course: Progress Notes, Reevaluation, and Consults:         Provider Notes (Medical Decision Making):   MDM  Number of Diagnoses or Management Options  Ectopic pregnancy of left ovary  Diagnosis management comments: 79-year-old female presenting to the emergency department for evaluation of possible miscarriage. She appears no acute distress although she does appear anxious. Vital signs stable. Pelvic exam does demonstrate blood in the vaginal vault but a closed cervix is consistent with a threatened . Will obtain transvaginal ultrasound in addition to screening lab work and quantitative hCG.      0106:  Beta-hCG 681 consistent with 5 weeks. Transvaginal ultrasound does not demonstrate any evidence of an IUP. There is an extra ovarian sac like structure adnexa measuring approximately 3.9 cm across yet is indeterminant for ectopic. Patient has been made aware of these findings.   Plan to transfer to facility with OB/GYN capabilities      0210:  Discussed case with Dr. Pranay Chavez, at [facility] who plans to evaluate the patient when she arrives to the ED at [facility].  ED to ED transfer initiated with Dr. Marc He accepting. Patient remains in stable condition. Patient updated          Diagnosis     Clinical Impression:   1. Ectopic pregnancy of left ovary        Disposition: transfer to [facility] ED    Follow-up Information    None          Patient's Medications    No medications on file     Disclaimer: Sections of this note are dictated using utilizing voice recognition software. Minor typographical errors may be present. If questions arise, please do not hesitate to contact me or call our department.

## 2021-11-23 NOTE — H&P
Gynecology History and Physical    Name: Chana Robins MRN: [id number] SSN: [id number]    YOB: 1985  Age: 39 y.o. Sex: female       Subjective:      Chief complaint: Abdominal pain, vaginal bleeding    Hortencia Cifuentes is a 39 y.o.  female who presents to [facility] ED as transfer from [facility] ED for evaluation for concern for ectopic pregnancy. Patient and her  are currently trying to conceive. She reports having cramping starting a few days ago. She started bleeding early yesterday evening and presented to the ED. She reports passing clots/tissue prior to presenting to ED and then just prior to transport to [facility]. She reports abodminal pain which is worse in intensity to a period. She also compares it to labor. They have 2 children at home. They are in town visiting friends/family. They live in [place] but are originally from this area. OB History        1    Para        Term                AB        Living           SAB        IAB        Ectopic        Molar        Multiple        Live Births                  PMH: Denies  PSH:  section    Gyn history    13 years ago CS  10 years ago   Denies STI history  LMP 10/28/21    Social alcohol  Denies smoking, recreational drugs. No past medical history on file. No past surgical history on file. Social History     Occupational History    Not on file   Tobacco Use    Smoking status: Never Smoker    Smokeless tobacco: Never Used   Vaping Use    Vaping Use: Never used   Substance and Sexual Activity    Alcohol use: Never    Drug use: Never    Sexual activity: Never     No family history on file. No Known Allergies  Prior to Admission medications    Not on File        Review of Systems:  A comprehensive review of systems was negative except for that written in the History of Present Illness.      Objective:     Vitals:    21 0401 21 0416 21 0500 21 0500   BP: 128/70  (!) 115/59    Pulse:  92     Resp:  16     Temp:  98.5 °F (36.9 °C)     SpO2: 100% 100% 99% 100%       Physical Exam:  Patient without distress. Abdomen: soft, diffuse tenderness, more near uterine fundus, without guarding, without rebound  External Genitalia: normal general appearance and blood on perineum  Vagina: normal mucosa without prolapse or lesions and presence of old blood  Cervix: normal appearance and presence of blood from cervical os, not actively hemorrhaging  Adnexa: normal bimanual exam  Uterus: tender at fundus  Negative CVA tenderness    TV Ultrasound at [facility]:   EXAMINATION: Ultrasound obstetrical, first trimester, transvaginal only     INDICATION: Vaginal bleeding, pregnancy     COMPARISON: None     TECHNIQUE: Grayscale, color, spectral sonographic images of the gravid pelvis  performed transvaginally.     FINDINGS:     Uterus 9.9 x 5.7 x 6.3 cm. Cervix appears closed. Endometrial canal/stripe 1.85  cm thick. No definite intrauterine gestational sac identified. No other  significant uterine/cervical findings.     Right ovary estimated 9 cc volume with venous and arterial flow subtly  visualized on spectral Doppler without obvious focal abnormality.     What is felt to represent the left ovary measures 22 cc volume with venous and  arterial flow on spectral Doppler, and contains a 1.6 x 2.0 cm hypoechoic  nodule. A second extra ovarian appearing left adnexal structure measures 3.9 cm  length.     Small amount of free fluid.     IMPRESSION     No definite intrauterine gestational sac identified. Indeterminant left adnexal  structure that appears extraovarian as described, for which ectopic gestation  should be considered in the appropriate clinical setting. From an imaging  standpoint, recommend at least serial beta-hCG measurements and follow-up  sonogram as warranted.  This was called to Dr. Burke Anand head 0035 hours,  11/23/2021.     Small amount of free fluid, nonspecific.     Possible left ovary corpus luteum. Assessment:     Abdominal pain, Vaginal bleeding, L adnexal mass, Pregnancy of unknown location - ectopic vs early pregnancy loss. Patient is hemodynamically stable    Plan:     bHCG at [facility] ED was 681 ([email or web address] ([email or web address])    Hgb stable 12.5>12.2    Blood type A pos, do not need rhogam    Ultrasound images personally reviewed, no definite ectopic pregnancy.  Repeat TVUS pending    Obs, serial abdominal exams, repeat bHCG this afternoon    Signed By:  Eric Malik MD     November 23, 2021

## 2021-11-23 NOTE — ANESTHESIA PREPROCEDURE EVALUATION
Relevant Problems   No relevant active problems       Anesthetic History     PONV          Review of Systems / Medical History  Patient summary reviewed, nursing notes reviewed and pertinent labs reviewed    Pulmonary  Within defined limits            Pertinent negatives: No sleep apnea and smoker     Neuro/Psych   Within defined limits           Cardiovascular  Within defined limits                Exercise tolerance: >4 METS     GI/Hepatic/Renal  Within defined limits           Pertinent negatives: No hiatal hernia and GERD   Endo/Other  Within defined limits           Other Findings              Physical Exam    Airway  Mallampati: II  TM Distance: > 6 cm  Neck ROM: normal range of motion   Mouth opening: Normal     Cardiovascular    Rhythm: regular  Rate: normal         Dental  No notable dental hx       Pulmonary  Breath sounds clear to auscultation               Abdominal  GI exam deferred       Other Findings            Anesthetic Plan    ASA: 1, emergent  Anesthesia type: general          Induction: Intravenous  Anesthetic plan and risks discussed with: Patient

## 2021-11-23 NOTE — ED TRIAGE NOTES
Patient arrives to the ED with abdominal cramping and bleeding. Patient states Balwinder Gordon is 5 weeks pregnant and she believes she is miscarrying\".

## 2021-11-23 NOTE — PERIOP NOTES
TRANSFER - IN REPORT:    Verbal report received from ED, RN(name) on Massimo Gordon  being received from ED(unit) for ordered procedure      Report consisted of patients Situation, Background, Assessment and   Recommendations(SBAR). Information from the following report(s) SBAR, Kardex and MAR was reviewed with the receiving nurse. Opportunity for questions and clarification was provided. Assessment completed upon patients arrival to unit and care assumed. Primary Nurse Farida Rocha RN and Bernie Wallace RN performed a dual skin assessment on this patient No impairment noted  Vernon score is 23      Reviewed PTA medication list with patient/caregiver and patient/caregiver denies any additional medications.  Patient admits to having a responsible adult care for them for at least 24 hours after surgery.     Patient belongings including purse, jewelry brought to PACU

## 2021-11-23 NOTE — ED NOTES
Pt updated on plan of care and status, all questions answered pt made aware will update as more information becomes available

## 2021-11-23 NOTE — ED NOTES
Medical transport at bedside.  EMtala complete and provided to transfer team. Pt remains stable and is in no distress

## 2021-11-23 NOTE — ED NOTES
Pt arrives via ems as an ed to ed transfer from [facility] emergency dept, for likely ectopic pregnancy. Pt is in NAD at this time. Provider at the bedside.

## 2021-11-24 PROBLEM — O00.102 LEFT TUBAL PREGNANCY WITHOUT INTRAUTERINE PREGNANCY: Status: ACTIVE | Noted: 2021-11-24

## 2021-11-24 LAB
BACTERIA SPEC CULT: NORMAL
SERVICE CMNT-IMP: NORMAL

## 2021-11-24 NOTE — PERIOP NOTES
Patient is tolerating PO fluids and crackers well. Discharge instructions given to patient's , verbalized understanding.

## 2021-11-24 NOTE — PERIOP NOTES
Anesthesia notified patient's sign out. Patient is awake, alert, denies nausea, tolerating PO fluids well.

## 2021-11-24 NOTE — PERIOP NOTES
Awake, alert, denies nausea, tolerated PO intake well, ambulated to BR with steady gait, voided large amount of urine, small bloody discharge on peripad, dressing CDI.

## 2021-11-24 NOTE — ANESTHESIA POSTPROCEDURE EVALUATION
Post-Anesthesia Evaluation & Assessment    Visit Vitals  BP (!) 111/55   Pulse 100   Temp 36.8 °C (98.3 °F)   Resp 18   SpO2 97%       Nausea/Vomiting: Controlled. Post-operative hydration adequate. Pain Scale 1: Numeric (0 - 10) (11/23/21 1853)  Pain Intensity 1: 1 (11/23/21 1853)   Managed    Pain score at or below stated goal level. Mental status & Level of consciousness: alert and oriented x 3    Neurological status: moves all extremities, sensation grossly intact    Pulmonary status: airway patent, adequate oxygenation. Complications related to anesthesia: none    Patient has met all PACU discharge requirements.       Anna Mosqueda DO

## 2021-11-30 NOTE — PROGRESS NOTES
11/30/21 [facility]0    Called patient to check on her and review pathology. She is back at home in [facility]. She has been feeling well and reports no issues with her incisions. She has scheduled a visit with her OBGYN on 12/13/21. Discussed pathology showing:    A: Endometrium, curettage:        Benign endometrium with decidualization and bermudez-jay reaction. No fetal tissue identified. B: Left fallopian tube, salpingectomy:        Fallopian tube with luminal blood clot containing rare trophoblast   aggregates consistent with ectopic pregnancy. All questions answered. Will expect contact in office from her primary OBGYN for records release.

## (undated) DEVICE — TROCAR: Brand: KII SLEEVE

## (undated) DEVICE — PAD PT POS 36 IN SURGYPAD DISP

## (undated) DEVICE — NEEDLE HYPO 21GA L1.5IN GRN POLYPR HUB S STL THN WALL IV

## (undated) DEVICE — TROCAR: Brand: KII® SLEEVE

## (undated) DEVICE — TROCAR: Brand: KII® OPTICAL ACCESS SYSTEM

## (undated) DEVICE — SUTURE MCRYL SZ 4-0 L27IN ABSRB UD L19MM PS-2 1/2 CIR PRIM Y426H

## (undated) DEVICE — GYN ONCOLOGY: Brand: MEDLINE INDUSTRIES, INC.

## (undated) DEVICE — DISPOSABLE SUCTION/IRRIGATOR TUBE SET WITH TIP: Brand: AHTO

## (undated) DEVICE — TOTAL TRAY, DB, 100% SILI FOLEY, 16FR 10: Brand: MEDLINE

## (undated) DEVICE — PAD,NON-ADHERENT,3X8,STERILE,LF,1/PK: Brand: MEDLINE

## (undated) DEVICE — TROCAR: Brand: KII OPTICAL ACCESS SYSTEM

## (undated) DEVICE — SEALER ENDOSCP L37CM NANO COAT BLNT TIP LAP DIV

## (undated) DEVICE — DERMABOND SKIN ADH 0.7ML -- DERMABOND ADVANCED 12/BX

## (undated) DEVICE — VISUALIZATION SYSTEM: Brand: CLEARIFY

## (undated) DEVICE — GLOVE SURG SZ 65 THK91MIL LTX FREE SYN POLYISOPRENE

## (undated) DEVICE — SOLUTION LACTATED RINGERS INJECTION USP

## (undated) DEVICE — GARMENT,MEDLINE,DVT,INT,CALF,MED, GEN2: Brand: MEDLINE

## (undated) DEVICE — SUT VCRL + 0 36IN UR6 VIO --

## (undated) DEVICE — TISSUE RETRIEVAL SYSTEM: Brand: INZII RETRIEVAL SYSTEM

## (undated) DEVICE — TROCARS: Brand: KII® BALLOON BLUNT TIP SYSTEM

## (undated) DEVICE — SOL IRRIGATION INJ NACL 0.9% 500ML BTL